# Patient Record
Sex: MALE | Race: WHITE | NOT HISPANIC OR LATINO | Employment: FULL TIME | URBAN - METROPOLITAN AREA
[De-identification: names, ages, dates, MRNs, and addresses within clinical notes are randomized per-mention and may not be internally consistent; named-entity substitution may affect disease eponyms.]

---

## 2023-01-28 PROBLEM — I10 HTN (HYPERTENSION): Status: ACTIVE | Noted: 2023-01-28

## 2023-01-28 PROBLEM — E66.9 OBESITY (BMI 30.0-34.9): Status: ACTIVE | Noted: 2023-01-28

## 2023-01-28 PROBLEM — E78.2 HYPERLIPIDEMIA, MIXED: Status: ACTIVE | Noted: 2023-01-28

## 2023-01-28 PROBLEM — M25.569 KNEE PAIN: Status: ACTIVE | Noted: 2023-01-28

## 2023-01-28 PROBLEM — E66.811 OBESITY (BMI 30.0-34.9): Status: ACTIVE | Noted: 2023-01-28

## 2023-01-28 PROBLEM — Z01.818 PRE-OP EXAM: Status: ACTIVE | Noted: 2023-01-28

## 2023-01-28 PROBLEM — M15.9 OSTEOARTHRITIS OF MULTIPLE JOINTS: Status: ACTIVE | Noted: 2023-01-28

## 2023-01-28 PROBLEM — M86.9: Status: ACTIVE | Noted: 2023-01-28

## 2023-01-28 PROBLEM — M79.673 HEEL PAIN: Status: ACTIVE | Noted: 2023-01-28

## 2023-01-28 RX ORDER — IBUPROFEN 800 MG/1
800 TABLET ORAL 2 TIMES DAILY PRN
COMMUNITY
Start: 2022-05-05 | End: 2023-03-13 | Stop reason: SDUPTHER

## 2023-01-28 RX ORDER — MULTIVITAMIN/IRON/FOLIC ACID 18MG-0.4MG
TABLET ORAL
COMMUNITY
Start: 2022-01-24

## 2023-01-28 RX ORDER — AMLODIPINE BESYLATE 5 MG/1
1 TABLET ORAL DAILY
COMMUNITY
Start: 2022-05-05 | End: 2023-03-13 | Stop reason: SDUPTHER

## 2023-01-28 RX ORDER — FENOFIBRATE 145 MG/1
1 TABLET, FILM COATED ORAL DAILY
COMMUNITY
Start: 2021-04-12 | End: 2023-05-22 | Stop reason: SDUPTHER

## 2023-01-28 RX ORDER — CHOLECALCIFEROL (VITAMIN D3) 50 MCG
1 TABLET ORAL DAILY
COMMUNITY
Start: 2022-01-24

## 2023-01-28 RX ORDER — TADALAFIL 5 MG/1
5 TABLET ORAL DAILY
COMMUNITY
End: 2023-03-24

## 2023-01-28 RX ORDER — ACETAMINOPHEN 500 MG
TABLET ORAL
COMMUNITY
Start: 2022-01-24

## 2023-03-13 ENCOUNTER — OFFICE VISIT (OUTPATIENT)
Dept: PRIMARY CARE | Facility: CLINIC | Age: 63
End: 2023-03-13
Payer: COMMERCIAL

## 2023-03-13 VITALS
DIASTOLIC BLOOD PRESSURE: 90 MMHG | BODY MASS INDEX: 30.34 KG/M2 | HEIGHT: 72 IN | WEIGHT: 224 LBS | SYSTOLIC BLOOD PRESSURE: 154 MMHG

## 2023-03-13 DIAGNOSIS — Z12.5 SCREENING FOR PROSTATE CANCER: ICD-10-CM

## 2023-03-13 DIAGNOSIS — I10 PRIMARY HYPERTENSION: Primary | ICD-10-CM

## 2023-03-13 DIAGNOSIS — R29.818 SUSPECTED SLEEP APNEA: ICD-10-CM

## 2023-03-13 DIAGNOSIS — M19.011 LOCALIZED OSTEOARTHRITIS OF BOTH SHOULDER REGIONS: ICD-10-CM

## 2023-03-13 DIAGNOSIS — E66.9 OBESITY (BMI 30.0-34.9): ICD-10-CM

## 2023-03-13 DIAGNOSIS — Z00.00 HEALTHCARE MAINTENANCE: ICD-10-CM

## 2023-03-13 DIAGNOSIS — E78.2 HYPERLIPIDEMIA, MIXED: ICD-10-CM

## 2023-03-13 DIAGNOSIS — M19.012 LOCALIZED OSTEOARTHRITIS OF BOTH SHOULDER REGIONS: ICD-10-CM

## 2023-03-13 DIAGNOSIS — Z00.00 HEALTH CARE MAINTENANCE: ICD-10-CM

## 2023-03-13 PROCEDURE — 3077F SYST BP >= 140 MM HG: CPT | Performed by: STUDENT IN AN ORGANIZED HEALTH CARE EDUCATION/TRAINING PROGRAM

## 2023-03-13 PROCEDURE — 3080F DIAST BP >= 90 MM HG: CPT | Performed by: STUDENT IN AN ORGANIZED HEALTH CARE EDUCATION/TRAINING PROGRAM

## 2023-03-13 PROCEDURE — 1036F TOBACCO NON-USER: CPT | Performed by: STUDENT IN AN ORGANIZED HEALTH CARE EDUCATION/TRAINING PROGRAM

## 2023-03-13 PROCEDURE — 99396 PREV VISIT EST AGE 40-64: CPT | Performed by: STUDENT IN AN ORGANIZED HEALTH CARE EDUCATION/TRAINING PROGRAM

## 2023-03-13 RX ORDER — IBUPROFEN 800 MG/1
800 TABLET ORAL 2 TIMES DAILY PRN
Qty: 180 TABLET | Refills: 0 | Status: SHIPPED | OUTPATIENT
Start: 2023-03-13 | End: 2023-06-12

## 2023-03-13 RX ORDER — AMLODIPINE BESYLATE 5 MG/1
5 TABLET ORAL DAILY
Qty: 90 TABLET | Refills: 1 | Status: SHIPPED | OUTPATIENT
Start: 2023-03-13 | End: 2023-09-05

## 2023-03-13 RX ORDER — CEPHALEXIN 500 MG/1
CAPSULE ORAL
COMMUNITY
Start: 2023-02-28

## 2023-03-13 NOTE — PROGRESS NOTES
"Subjective   Patient ID: Mann Mcclain is a 62 y.o. male who presents for Follow-up (Questions about \"screenings\").    HPI   Routine fu. Physical.     He had left TSA performed 12/2022. Surgery went well, recovery is also going relatively well, though he notes some numbness in first 3 fingers in right hand, plans to discuss this with surgeon this week.    BP at home has been 110s-130 systolic.    His brother was recently diagnosed with bladder cancer, patient wondering about screening.    He also says that he snores at night, but is not sure if he would like to be tested for ARY.    Review of Systems  12-point ROS reviewed and was negative unless otherwise noted in HPI.    Objective   /90   Ht 1.829 m (6')   Wt 102 kg (224 lb)   BMI 30.38 kg/m²     Physical Exam  GEN: conversant, NAD  HEENT: PERRL, EOMI, wearing a mask  NECK: supple, no carotid bruits appreciated b/l  CV: S1, S2, RRR  PULM: CTAB  ABD: soft, NT, ND  NEURO: no new gross focal deficits  EXT: no sig LE edema  PSYCH: appropriate affect    Assessment/Plan     #HTN  -continue amlodipine, stable on home readings     #HLD/Obesity  - Cont. fenofibrate  - Encouraged continued efforts at weight loss  - check CACS    #Snoring/suspected ARY  -referral to sleep medicine     #HM  - Colonoscopy (2022, next due in 2027 given +FH)  - Vaccines: received COVID-19 vaccines, TDaP (2017), Shingrix, declines flu shot     RTC in 6 months      "

## 2023-03-23 DIAGNOSIS — N52.9 ERECTILE DYSFUNCTION, UNSPECIFIED ERECTILE DYSFUNCTION TYPE: Primary | ICD-10-CM

## 2023-03-24 RX ORDER — TADALAFIL 5 MG/1
TABLET ORAL
Qty: 30 TABLET | Refills: 3 | Status: SHIPPED | OUTPATIENT
Start: 2023-03-24

## 2023-05-22 DIAGNOSIS — E78.2 HYPERLIPIDEMIA, MIXED: Primary | ICD-10-CM

## 2023-05-23 RX ORDER — FENOFIBRATE 145 MG/1
145 TABLET, FILM COATED ORAL DAILY
Qty: 90 TABLET | Refills: 0 | Status: SHIPPED | OUTPATIENT
Start: 2023-05-23 | End: 2023-10-19

## 2023-06-12 DIAGNOSIS — M19.012 LOCALIZED OSTEOARTHRITIS OF BOTH SHOULDER REGIONS: ICD-10-CM

## 2023-06-12 DIAGNOSIS — M19.011 LOCALIZED OSTEOARTHRITIS OF BOTH SHOULDER REGIONS: ICD-10-CM

## 2023-06-12 RX ORDER — IBUPROFEN 800 MG/1
TABLET ORAL
Qty: 180 TABLET | Refills: 0 | Status: SHIPPED | OUTPATIENT
Start: 2023-06-12 | End: 2023-09-11

## 2023-09-04 DIAGNOSIS — I10 PRIMARY HYPERTENSION: ICD-10-CM

## 2023-09-05 RX ORDER — AMLODIPINE BESYLATE 5 MG/1
5 TABLET ORAL DAILY
Qty: 90 TABLET | Refills: 0 | Status: SHIPPED | OUTPATIENT
Start: 2023-09-05 | End: 2023-12-22 | Stop reason: SDUPTHER

## 2023-09-11 DIAGNOSIS — M19.011 LOCALIZED OSTEOARTHRITIS OF BOTH SHOULDER REGIONS: ICD-10-CM

## 2023-09-11 DIAGNOSIS — M19.012 LOCALIZED OSTEOARTHRITIS OF BOTH SHOULDER REGIONS: ICD-10-CM

## 2023-09-11 RX ORDER — IBUPROFEN 800 MG/1
TABLET ORAL
Qty: 180 TABLET | Refills: 0 | Status: SHIPPED | OUTPATIENT
Start: 2023-09-11 | End: 2023-12-13

## 2023-09-13 ENCOUNTER — APPOINTMENT (OUTPATIENT)
Dept: PRIMARY CARE | Facility: CLINIC | Age: 63
End: 2023-09-13
Payer: COMMERCIAL

## 2023-10-12 DIAGNOSIS — E78.2 HYPERLIPIDEMIA, MIXED: ICD-10-CM

## 2023-10-19 RX ORDER — FENOFIBRATE 145 MG/1
145 TABLET, FILM COATED ORAL DAILY
Qty: 30 TABLET | Refills: 0 | Status: SHIPPED | OUTPATIENT
Start: 2023-10-19 | End: 2023-12-22 | Stop reason: SDUPTHER

## 2023-12-04 DIAGNOSIS — I10 PRIMARY HYPERTENSION: ICD-10-CM

## 2023-12-05 RX ORDER — AMLODIPINE BESYLATE 5 MG/1
5 TABLET ORAL DAILY
Qty: 90 TABLET | Refills: 0 | OUTPATIENT
Start: 2023-12-05

## 2023-12-11 DIAGNOSIS — M19.012 LOCALIZED OSTEOARTHRITIS OF BOTH SHOULDER REGIONS: ICD-10-CM

## 2023-12-11 DIAGNOSIS — M19.011 LOCALIZED OSTEOARTHRITIS OF BOTH SHOULDER REGIONS: ICD-10-CM

## 2023-12-13 RX ORDER — IBUPROFEN 800 MG/1
TABLET ORAL
Qty: 60 TABLET | Refills: 0 | Status: SHIPPED | OUTPATIENT
Start: 2023-12-13

## 2023-12-22 ENCOUNTER — OFFICE VISIT (OUTPATIENT)
Dept: PRIMARY CARE | Facility: CLINIC | Age: 63
End: 2023-12-22
Payer: COMMERCIAL

## 2023-12-22 VITALS
WEIGHT: 229 LBS | HEIGHT: 72 IN | OXYGEN SATURATION: 95 % | SYSTOLIC BLOOD PRESSURE: 177 MMHG | BODY MASS INDEX: 31.02 KG/M2 | DIASTOLIC BLOOD PRESSURE: 103 MMHG | HEART RATE: 87 BPM

## 2023-12-22 DIAGNOSIS — I10 PRIMARY HYPERTENSION: ICD-10-CM

## 2023-12-22 DIAGNOSIS — F41.9 ANXIETY: Primary | ICD-10-CM

## 2023-12-22 DIAGNOSIS — E78.2 HYPERLIPIDEMIA, MIXED: ICD-10-CM

## 2023-12-22 PROCEDURE — 99214 OFFICE O/P EST MOD 30 MIN: CPT | Performed by: FAMILY MEDICINE

## 2023-12-22 PROCEDURE — 3080F DIAST BP >= 90 MM HG: CPT | Performed by: FAMILY MEDICINE

## 2023-12-22 PROCEDURE — 3077F SYST BP >= 140 MM HG: CPT | Performed by: FAMILY MEDICINE

## 2023-12-22 PROCEDURE — 1036F TOBACCO NON-USER: CPT | Performed by: FAMILY MEDICINE

## 2023-12-22 RX ORDER — BUSPIRONE HYDROCHLORIDE 5 MG/1
5 TABLET ORAL 2 TIMES DAILY
Qty: 60 TABLET | Refills: 1 | Status: SHIPPED | OUTPATIENT
Start: 2023-12-22 | End: 2024-12-21

## 2023-12-22 RX ORDER — FENOFIBRATE 145 MG/1
145 TABLET, FILM COATED ORAL DAILY
Qty: 90 TABLET | Refills: 0 | Status: SHIPPED | OUTPATIENT
Start: 2023-12-22 | End: 2024-04-05

## 2023-12-22 RX ORDER — AMLODIPINE BESYLATE 5 MG/1
5 TABLET ORAL DAILY
Qty: 90 TABLET | Refills: 1 | Status: SHIPPED | OUTPATIENT
Start: 2023-12-22

## 2023-12-22 ASSESSMENT — ENCOUNTER SYMPTOMS
CONSTITUTIONAL NEGATIVE: 1
HEMATOLOGIC/LYMPHATIC NEGATIVE: 1
RESPIRATORY NEGATIVE: 1
GASTROINTESTINAL NEGATIVE: 1
ARTHRALGIAS: 1
CARDIOVASCULAR NEGATIVE: 1

## 2023-12-22 NOTE — PROGRESS NOTES
Subjective   Patient ID: Mann Mcclain is a 63 y.o. male who presents for Annual Exam.  HPI  Knee replacement   Review of Systems   Constitutional: Negative.    HENT: Negative.     Respiratory: Negative.     Cardiovascular: Negative.    Gastrointestinal: Negative.    Genitourinary: Negative.    Musculoskeletal:  Positive for arthralgias and gait problem.   Hematological: Negative.        Objective   Physical Exam  General no acute process no icterus well-hydrated alert active oriented    HEENT normocephalic no palpable tenderness eyes pupils equal reactive light and accommodation extraocular muscles intact no icterus and/or erythema ears benign external auditory canal no gross deformities nose no discharge drainage erythema bleeding throat no erythema.    Heart regular rate and rhythm without S3-S4 or murmur    Lungs clear to auscultation x2 no rales or rhonchi    Abdomen soft nontender nondistended no palpable masses no organomegaly splenomegaly.    Integument no rash no lumps bumps or concerning lesions.    Neurologic no tics tremors or seizures no decreased range of motion or ataxia.    Musculoskeletal good range of motion no gross abnormalities noted  Assessment/Plan   Problem List Items Addressed This Visit             ICD-10-CM    Hyperlipidemia, mixed E78.2    Relevant Medications    fenofibrate (Tricor) 145 mg tablet    HTN (hypertension) I10    Relevant Medications    amLODIPine (Norvasc) 5 mg tablet            Luis Enrique Nix DO 12/22/23 8:36 AM

## 2024-01-11 ENCOUNTER — HOSPITAL ENCOUNTER (OUTPATIENT)
Dept: VASCULAR MEDICINE | Facility: HOSPITAL | Age: 64
Discharge: HOME | End: 2024-01-11
Payer: COMMERCIAL

## 2024-01-11 DIAGNOSIS — M79.662 PAIN IN LEFT LOWER LEG: ICD-10-CM

## 2024-01-11 DIAGNOSIS — M79.605 PAIN IN LEFT LEG: ICD-10-CM

## 2024-01-11 PROCEDURE — 93971 EXTREMITY STUDY: CPT | Performed by: INTERNAL MEDICINE

## 2024-01-11 PROCEDURE — 93971 EXTREMITY STUDY: CPT

## 2024-06-28 ENCOUNTER — APPOINTMENT (OUTPATIENT)
Dept: PRIMARY CARE | Facility: CLINIC | Age: 64
End: 2024-06-28
Payer: COMMERCIAL

## 2024-06-28 VITALS
SYSTOLIC BLOOD PRESSURE: 148 MMHG | WEIGHT: 219 LBS | HEIGHT: 72 IN | BODY MASS INDEX: 29.66 KG/M2 | OXYGEN SATURATION: 95 % | DIASTOLIC BLOOD PRESSURE: 84 MMHG | HEART RATE: 90 BPM | TEMPERATURE: 98 F

## 2024-06-28 DIAGNOSIS — F41.9 ANXIETY: ICD-10-CM

## 2024-06-28 DIAGNOSIS — E78.2 HYPERLIPIDEMIA, MIXED: ICD-10-CM

## 2024-06-28 DIAGNOSIS — I10 PRIMARY HYPERTENSION: ICD-10-CM

## 2024-06-28 DIAGNOSIS — M19.011 LOCALIZED OSTEOARTHRITIS OF BOTH SHOULDER REGIONS: ICD-10-CM

## 2024-06-28 DIAGNOSIS — E78.2 HYPERLIPIDEMIA, MIXED: Primary | ICD-10-CM

## 2024-06-28 DIAGNOSIS — R05.3 CHRONIC COUGH: ICD-10-CM

## 2024-06-28 DIAGNOSIS — M19.012 LOCALIZED OSTEOARTHRITIS OF BOTH SHOULDER REGIONS: ICD-10-CM

## 2024-06-28 DIAGNOSIS — N52.9 ERECTILE DYSFUNCTION, UNSPECIFIED ERECTILE DYSFUNCTION TYPE: ICD-10-CM

## 2024-06-28 RX ORDER — FENOFIBRATE 145 MG/1
145 TABLET, FILM COATED ORAL DAILY
Qty: 90 TABLET | Refills: 0 | Status: SHIPPED | OUTPATIENT
Start: 2024-06-28 | End: 2024-06-28 | Stop reason: SDUPTHER

## 2024-06-28 RX ORDER — IBUPROFEN 800 MG/1
TABLET ORAL
Qty: 60 TABLET | Refills: 1 | Status: SHIPPED | OUTPATIENT
Start: 2024-06-28

## 2024-06-28 RX ORDER — BUSPIRONE HYDROCHLORIDE 5 MG/1
5 TABLET ORAL 2 TIMES DAILY
Qty: 60 TABLET | Refills: 1 | Status: SHIPPED | OUTPATIENT
Start: 2024-06-28 | End: 2025-06-28

## 2024-06-28 RX ORDER — TADALAFIL 5 MG/1
TABLET ORAL
Qty: 30 TABLET | Refills: 3 | Status: SHIPPED | OUTPATIENT
Start: 2024-06-28

## 2024-06-28 RX ORDER — FENOFIBRATE 145 MG/1
145 TABLET, FILM COATED ORAL DAILY
Qty: 90 TABLET | Refills: 0 | Status: SHIPPED | OUTPATIENT
Start: 2024-06-28

## 2024-06-28 ASSESSMENT — ENCOUNTER SYMPTOMS
MUSCULOSKELETAL NEGATIVE: 1
GASTROINTESTINAL NEGATIVE: 1
NEUROLOGICAL NEGATIVE: 1
CONSTITUTIONAL NEGATIVE: 1
RESPIRATORY NEGATIVE: 1
CARDIOVASCULAR NEGATIVE: 1

## 2024-06-28 ASSESSMENT — PATIENT HEALTH QUESTIONNAIRE - PHQ9
2. FEELING DOWN, DEPRESSED OR HOPELESS: NOT AT ALL
SUM OF ALL RESPONSES TO PHQ9 QUESTIONS 1 & 2: 0
1. LITTLE INTEREST OR PLEASURE IN DOING THINGS: NOT AT ALL

## 2024-06-28 NOTE — PROGRESS NOTES
Subjective   Patient ID: Mann Mcclain is a 64 y.o. male who presents for Annual Exam.  HPI    Review of Systems   Constitutional: Negative.    HENT: Negative.     Respiratory: Negative.     Cardiovascular: Negative.    Gastrointestinal: Negative.    Genitourinary: Negative.    Musculoskeletal: Negative.    Neurological: Negative.        Objective   Physical Exam  Constitutional:       Appearance: Normal appearance.   HENT:      Head: Normocephalic and atraumatic.      Nose: Nose normal.      Mouth/Throat:      Mouth: Mucous membranes are moist.   Eyes:      Extraocular Movements: Extraocular movements intact.      Pupils: Pupils are equal, round, and reactive to light.   Cardiovascular:      Rate and Rhythm: Normal rate and regular rhythm.   Abdominal:      General: Abdomen is flat.      Palpations: Abdomen is soft.   Musculoskeletal:         General: Normal range of motion.   Skin:     General: Skin is warm and dry.   Neurological:      Mental Status: He is alert.         Assessment/Plan   Problem List Items Addressed This Visit             ICD-10-CM    Hyperlipidemia, mixed - Primary E78.2    Relevant Medications    fenofibrate (Tricor) 145 mg tablet    Other Relevant Orders    Comprehensive Metabolic Panel    Lipid Panel    Prostate Specific Antigen    CT cardiac scoring wo IV contrast    XR chest 2 views    CBC    Microscopic Only, Urine    HTN (hypertension) I10    Relevant Orders    Comprehensive Metabolic Panel    Lipid Panel    Prostate Specific Antigen    CT cardiac scoring wo IV contrast    XR chest 2 views    CBC    Microscopic Only, Urine     Other Visit Diagnoses         Codes    Chronic cough     R05.3    Relevant Orders    Comprehensive Metabolic Panel    Lipid Panel    Prostate Specific Antigen    CT cardiac scoring wo IV contrast    XR chest 2 views    CBC    Microscopic Only, Urine    Localized osteoarthritis of both shoulder regions     M19.011, M19.012    Relevant Medications    ibuprofen 800  mg tablet    Anxiety     F41.9    Relevant Medications    busPIRone (Buspar) 5 mg tablet    Erectile dysfunction, unspecified erectile dysfunction type     N52.9    Relevant Medications    tadalafil (Cialis) 5 mg tablet                 Luis Enrique Nix DO 06/28/24 1:25 PM

## 2024-08-12 ENCOUNTER — APPOINTMENT (OUTPATIENT)
Dept: PRIMARY CARE | Facility: CLINIC | Age: 64
End: 2024-08-12
Payer: COMMERCIAL

## 2024-09-23 DIAGNOSIS — F41.9 ANXIETY: ICD-10-CM

## 2024-09-23 RX ORDER — BUSPIRONE HYDROCHLORIDE 5 MG/1
5 TABLET ORAL 2 TIMES DAILY
Qty: 60 TABLET | Refills: 1 | Status: SHIPPED | OUTPATIENT
Start: 2024-09-23 | End: 2025-09-23

## 2024-09-23 NOTE — TELEPHONE ENCOUNTER
Rx Refill Request Telephone Encounter    Name:  Mann Mcclain  :  [id number]  Medication Name:  BUSPIRONE   5 MG          Specific Pharmacy location:  [facility]  Date of last appointment:  2024  Date of next appointment:  10/18/2024  Best number to reach patient:

## 2024-10-03 ENCOUNTER — HOSPITAL ENCOUNTER (OUTPATIENT)
Dept: RADIOLOGY | Facility: HOSPITAL | Age: 64
Discharge: HOME | End: 2024-10-03
Payer: COMMERCIAL

## 2024-10-03 DIAGNOSIS — E78.2 HYPERLIPIDEMIA, MIXED: ICD-10-CM

## 2024-10-03 DIAGNOSIS — R05.3 CHRONIC COUGH: ICD-10-CM

## 2024-10-03 DIAGNOSIS — I10 PRIMARY HYPERTENSION: ICD-10-CM

## 2024-10-03 PROCEDURE — 71046 X-RAY EXAM CHEST 2 VIEWS: CPT

## 2024-10-03 PROCEDURE — 75571 CT HRT W/O DYE W/CA TEST: CPT

## 2024-10-10 ENCOUNTER — APPOINTMENT (OUTPATIENT)
Dept: PRIMARY CARE | Facility: CLINIC | Age: 64
End: 2024-10-10
Payer: COMMERCIAL

## 2024-10-10 ENCOUNTER — LAB (OUTPATIENT)
Dept: LAB | Facility: LAB | Age: 64
End: 2024-10-10
Payer: COMMERCIAL

## 2024-10-10 DIAGNOSIS — I10 PRIMARY HYPERTENSION: ICD-10-CM

## 2024-10-10 DIAGNOSIS — R05.3 CHRONIC COUGH: ICD-10-CM

## 2024-10-10 DIAGNOSIS — E78.2 HYPERLIPIDEMIA, MIXED: ICD-10-CM

## 2024-10-10 LAB
ALBUMIN SERPL BCP-MCNC: 4.5 G/DL (ref 3.4–5)
ALP SERPL-CCNC: 28 U/L (ref 33–136)
ALT SERPL W P-5'-P-CCNC: 33 U/L (ref 10–52)
ANION GAP SERPL CALC-SCNC: 11 MMOL/L (ref 10–20)
AST SERPL W P-5'-P-CCNC: 27 U/L (ref 9–39)
BILIRUB SERPL-MCNC: 0.7 MG/DL (ref 0–1.2)
BUN SERPL-MCNC: 14 MG/DL (ref 6–23)
CALCIUM SERPL-MCNC: 9.2 MG/DL (ref 8.6–10.6)
CHLORIDE SERPL-SCNC: 103 MMOL/L (ref 98–107)
CHOLEST SERPL-MCNC: 184 MG/DL (ref 0–199)
CHOLESTEROL/HDL RATIO: 3.3
CO2 SERPL-SCNC: 29 MMOL/L (ref 21–32)
CREAT SERPL-MCNC: 0.86 MG/DL (ref 0.5–1.3)
EGFRCR SERPLBLD CKD-EPI 2021: >90 ML/MIN/1.73M*2
ERYTHROCYTE [DISTWIDTH] IN BLOOD BY AUTOMATED COUNT: 12.2 % (ref 11.5–14.5)
GLUCOSE SERPL-MCNC: 90 MG/DL (ref 74–99)
HCT VFR BLD AUTO: 46.9 % (ref 41–52)
HDLC SERPL-MCNC: 55.5 MG/DL
HGB BLD-MCNC: 15.4 G/DL (ref 13.5–17.5)
LDLC SERPL CALC-MCNC: 108 MG/DL
MCH RBC QN AUTO: 29.9 PG (ref 26–34)
MCHC RBC AUTO-ENTMCNC: 32.8 G/DL (ref 32–36)
MCV RBC AUTO: 91 FL (ref 80–100)
NON HDL CHOLESTEROL: 129 MG/DL (ref 0–149)
NRBC BLD-RTO: 0 /100 WBCS (ref 0–0)
PLATELET # BLD AUTO: 195 X10*3/UL (ref 150–450)
POTASSIUM SERPL-SCNC: 4.2 MMOL/L (ref 3.5–5.3)
PROT SERPL-MCNC: 7.2 G/DL (ref 6.4–8.2)
PSA SERPL-MCNC: 1.76 NG/ML
RBC # BLD AUTO: 5.15 X10*6/UL (ref 4.5–5.9)
RBC #/AREA URNS AUTO: NORMAL /HPF
SODIUM SERPL-SCNC: 139 MMOL/L (ref 136–145)
TRIGL SERPL-MCNC: 105 MG/DL (ref 0–149)
VLDL: 21 MG/DL (ref 0–40)
WBC # BLD AUTO: 4.6 X10*3/UL (ref 4.4–11.3)
WBC #/AREA URNS AUTO: NORMAL /HPF

## 2024-10-10 PROCEDURE — 81001 URINALYSIS AUTO W/SCOPE: CPT

## 2024-10-10 PROCEDURE — 84153 ASSAY OF PSA TOTAL: CPT

## 2024-10-10 PROCEDURE — 80053 COMPREHEN METABOLIC PANEL: CPT

## 2024-10-10 PROCEDURE — 80061 LIPID PANEL: CPT

## 2024-10-10 PROCEDURE — 85027 COMPLETE CBC AUTOMATED: CPT

## 2024-10-10 PROCEDURE — 36415 COLL VENOUS BLD VENIPUNCTURE: CPT

## 2024-10-18 ENCOUNTER — APPOINTMENT (OUTPATIENT)
Dept: PRIMARY CARE | Facility: CLINIC | Age: 64
End: 2024-10-18
Payer: COMMERCIAL

## 2024-10-18 VITALS
SYSTOLIC BLOOD PRESSURE: 167 MMHG | HEIGHT: 72 IN | DIASTOLIC BLOOD PRESSURE: 103 MMHG | WEIGHT: 226 LBS | BODY MASS INDEX: 30.61 KG/M2 | HEART RATE: 81 BPM | OXYGEN SATURATION: 95 %

## 2024-10-18 DIAGNOSIS — N52.9 ERECTILE DYSFUNCTION, UNSPECIFIED ERECTILE DYSFUNCTION TYPE: ICD-10-CM

## 2024-10-18 DIAGNOSIS — I10 PRIMARY HYPERTENSION: Primary | ICD-10-CM

## 2024-10-18 DIAGNOSIS — M19.012 LOCALIZED OSTEOARTHRITIS OF BOTH SHOULDER REGIONS: ICD-10-CM

## 2024-10-18 DIAGNOSIS — I25.10 ASHD (ARTERIOSCLEROTIC HEART DISEASE): ICD-10-CM

## 2024-10-18 DIAGNOSIS — M19.011 LOCALIZED OSTEOARTHRITIS OF BOTH SHOULDER REGIONS: ICD-10-CM

## 2024-10-18 DIAGNOSIS — E78.2 HYPERLIPIDEMIA, MIXED: ICD-10-CM

## 2024-10-18 PROCEDURE — 99214 OFFICE O/P EST MOD 30 MIN: CPT | Performed by: FAMILY MEDICINE

## 2024-10-18 PROCEDURE — 3077F SYST BP >= 140 MM HG: CPT | Performed by: FAMILY MEDICINE

## 2024-10-18 PROCEDURE — 3008F BODY MASS INDEX DOCD: CPT | Performed by: FAMILY MEDICINE

## 2024-10-18 PROCEDURE — 3080F DIAST BP >= 90 MM HG: CPT | Performed by: FAMILY MEDICINE

## 2024-10-18 RX ORDER — TADALAFIL 5 MG/1
TABLET ORAL
Qty: 30 TABLET | Refills: 3 | Status: SHIPPED | OUTPATIENT
Start: 2024-10-18

## 2024-10-18 RX ORDER — METOPROLOL SUCCINATE 50 MG/1
50 TABLET, EXTENDED RELEASE ORAL DAILY
Qty: 90 TABLET | Refills: 1 | Status: SHIPPED | OUTPATIENT
Start: 2024-10-18 | End: 2025-04-16

## 2024-10-18 RX ORDER — FENOFIBRATE 145 MG/1
145 TABLET, FILM COATED ORAL DAILY
Qty: 90 TABLET | Refills: 2 | Status: SHIPPED | OUTPATIENT
Start: 2024-10-18

## 2024-10-18 RX ORDER — IBUPROFEN 800 MG/1
TABLET ORAL
Qty: 100 TABLET | Refills: 1 | Status: SHIPPED | OUTPATIENT
Start: 2024-10-18

## 2024-10-18 ASSESSMENT — PATIENT HEALTH QUESTIONNAIRE - PHQ9
1. LITTLE INTEREST OR PLEASURE IN DOING THINGS: NOT AT ALL
2. FEELING DOWN, DEPRESSED OR HOPELESS: NOT AT ALL
SUM OF ALL RESPONSES TO PHQ9 QUESTIONS 1 AND 2: 0

## 2024-10-18 ASSESSMENT — ENCOUNTER SYMPTOMS
HEMATOLOGIC/LYMPHATIC NEGATIVE: 1
CONSTITUTIONAL NEGATIVE: 1
MUSCULOSKELETAL NEGATIVE: 1
CARDIOVASCULAR NEGATIVE: 1
RESPIRATORY NEGATIVE: 1
NEUROLOGICAL NEGATIVE: 1

## 2024-10-18 NOTE — PROGRESS NOTES
Subjective   Patient ID: Mann Mcclain is a 64 y.o. male who presents for Follow-up (Test results ).  HPI  Doing well no acute process  Review of Systems   Constitutional: Negative.    HENT: Negative.     Respiratory: Negative.     Cardiovascular: Negative.    Genitourinary: Negative.    Musculoskeletal: Negative.    Neurological: Negative.    Hematological: Negative.    Physical Exam  Constitutional:       Appearance: Normal appearance.   HENT:      Head: Normocephalic and atraumatic.      Nose: Nose normal.      Mouth/Throat:      Mouth: Mucous membranes are moist.   Eyes:      Extraocular Movements: Extraocular movements intact.      Pupils: Pupils are equal, round, and reactive to light.   Cardiovascular:      Rate and Rhythm: Normal rate and regular rhythm.   Abdominal:      General: Abdomen is flat.      Palpations: Abdomen is soft.   Musculoskeletal:         General: Normal range of motion.   Skin:     General: Skin is warm and dry.   Neurological:      Mental Status: He is alert.           Objective   Physical Exam    Assessment/Plan            Luis Enrique Nix DO 10/18/24 8:33 AM

## 2024-10-21 ENCOUNTER — TELEPHONE (OUTPATIENT)
Dept: PRIMARY CARE | Facility: CLINIC | Age: 64
End: 2024-10-21
Payer: COMMERCIAL

## 2024-10-21 NOTE — TELEPHONE ENCOUNTER
Spoke to patient regarding lab results, he does have questions that he would like to talk to you about regarding the PSA and also his Chest xray results.

## 2024-11-14 ENCOUNTER — APPOINTMENT (OUTPATIENT)
Dept: PRIMARY CARE | Facility: CLINIC | Age: 64
End: 2024-11-14
Payer: COMMERCIAL

## 2024-11-14 DIAGNOSIS — R05.3 CHRONIC COUGH: Primary | ICD-10-CM

## 2024-11-14 PROCEDURE — 99442 PR PHYS/QHP TELEPHONE EVALUATION 11-20 MIN: CPT | Performed by: FAMILY MEDICINE

## 2024-11-15 NOTE — PROGRESS NOTES
Subjective   Patient ID: Mann Mcclain is a 64 y.o. male who presents for Follow-up (X-ray results).  HPI  Patient could not uses video we did a telephone visit instead.  Spent 10 to 15 minutes talk to him on the phone patient needed to review x-rays as well as medications.  Patient had an abnormal chest x-ray need follow-up on his chest x-ray I reviewed it review the images myself talked it over with the patient told me he was having some blunting of the CV angle on the right and had some what looks like atelectasis I talked him about causes of atelectasis and told him definitely needed to follow-up to make sure that there was some clearing or make sure that there was no specific problems behind her within the atelectatic area.    Patient is going to get a another chest x-ray we will follow-up with us in the office.I performed this visit using real-time telehealth tools including an audio/video or telephone connection between this patient at their home and myself at [facility] office   Review of Systems    Objective   Physical Exam    Assessment/Plan            Luis Enrique Nix DO 11/15/24 8:01 AM

## 2024-12-12 ENCOUNTER — HOSPITAL ENCOUNTER (OUTPATIENT)
Dept: RADIOLOGY | Facility: HOSPITAL | Age: 64
Discharge: HOME | End: 2024-12-12
Payer: COMMERCIAL

## 2024-12-12 DIAGNOSIS — R05.3 CHRONIC COUGH: ICD-10-CM

## 2024-12-12 PROCEDURE — 71046 X-RAY EXAM CHEST 2 VIEWS: CPT

## 2024-12-20 ENCOUNTER — TELEPHONE (OUTPATIENT)
Dept: PRIMARY CARE | Facility: CLINIC | Age: 64
End: 2024-12-20
Payer: COMMERCIAL

## 2024-12-20 NOTE — TELEPHONE ENCOUNTER
----- Message from Luis Enrique Nix sent at 12/20/2024  8:13 AM EST -----  Chest x-ray shows no significant change

## 2025-01-09 ENCOUNTER — APPOINTMENT (OUTPATIENT)
Dept: PRIMARY CARE | Facility: CLINIC | Age: 65
End: 2025-01-09
Payer: COMMERCIAL

## 2025-01-28 ENCOUNTER — TELEPHONE (OUTPATIENT)
Dept: PRIMARY CARE | Facility: CLINIC | Age: 65
End: 2025-01-28
Payer: COMMERCIAL

## 2025-01-28 PROBLEM — I80.9 SUPERFICIAL THROMBOPHLEBITIS: Status: ACTIVE | Noted: 2025-01-28

## 2025-01-28 PROBLEM — M21.162 ACQUIRED VARUS DEFORMITY OF KNEE, LEFT: Status: ACTIVE | Noted: 2023-08-17

## 2025-01-28 PROBLEM — Z01.818 PRE-OP EXAM: Status: RESOLVED | Noted: 2023-01-28 | Resolved: 2025-01-28

## 2025-01-28 PROBLEM — M21.161 ACQUIRED VARUS DEFORMITY OF KNEE, RIGHT: Status: ACTIVE | Noted: 2023-08-17

## 2025-01-28 PROBLEM — M19.012 OSTEOARTHRITIS OF LEFT SHOULDER: Status: ACTIVE | Noted: 2025-01-28

## 2025-01-28 PROBLEM — M17.0 PRIMARY OSTEOARTHRITIS OF BOTH KNEES: Status: ACTIVE | Noted: 2023-08-17

## 2025-01-28 PROBLEM — Z96.652 STATUS POST TOTAL LEFT KNEE REPLACEMENT: Status: ACTIVE | Noted: 2024-05-06

## 2025-01-28 PROBLEM — M23.8X2 CREPITUS OF BOTH KNEE JOINTS: Status: ACTIVE | Noted: 2023-08-17

## 2025-01-28 PROBLEM — M23.8X1 CREPITUS OF BOTH KNEE JOINTS: Status: ACTIVE | Noted: 2023-08-17

## 2025-01-28 NOTE — TELEPHONE ENCOUNTER
Patient wants to know what the next steps are for his chest X-ray and his symptoms. Please advise.

## 2025-02-15 DIAGNOSIS — F41.9 ANXIETY: ICD-10-CM

## 2025-02-17 DIAGNOSIS — R93.89 ABNORMAL CXR: Primary | ICD-10-CM

## 2025-02-17 PROBLEM — I25.10 CORONARY ARTERY DISEASE INVOLVING NATIVE CORONARY ARTERY OF NATIVE HEART WITHOUT ANGINA PECTORIS: Chronic | Status: ACTIVE | Noted: 2025-02-17

## 2025-02-17 PROBLEM — I10 HTN (HYPERTENSION): Chronic | Status: ACTIVE | Noted: 2023-01-28

## 2025-02-17 PROBLEM — M23.8X1 CREPITUS OF BOTH KNEE JOINTS: Status: RESOLVED | Noted: 2023-08-17 | Resolved: 2025-02-17

## 2025-02-17 PROBLEM — M15.9 OSTEOARTHRITIS OF MULTIPLE JOINTS: Status: RESOLVED | Noted: 2023-01-28 | Resolved: 2025-02-17

## 2025-02-17 PROBLEM — M17.0 PRIMARY OSTEOARTHRITIS OF BOTH KNEES: Status: RESOLVED | Noted: 2023-08-17 | Resolved: 2025-02-17

## 2025-02-17 PROBLEM — M21.162 ACQUIRED VARUS DEFORMITY OF KNEE, LEFT: Status: RESOLVED | Noted: 2023-08-17 | Resolved: 2025-02-17

## 2025-02-17 PROBLEM — M21.161 ACQUIRED VARUS DEFORMITY OF KNEE, RIGHT: Status: RESOLVED | Noted: 2023-08-17 | Resolved: 2025-02-17

## 2025-02-17 PROBLEM — Z96.652 STATUS POST TOTAL LEFT KNEE REPLACEMENT: Status: RESOLVED | Noted: 2024-05-06 | Resolved: 2025-02-17

## 2025-02-17 PROBLEM — M25.569 KNEE PAIN: Status: RESOLVED | Noted: 2023-01-28 | Resolved: 2025-02-17

## 2025-02-17 PROBLEM — M79.673 HEEL PAIN: Status: RESOLVED | Noted: 2023-01-28 | Resolved: 2025-02-17

## 2025-02-17 PROBLEM — E78.2 HYPERLIPIDEMIA, MIXED: Chronic | Status: ACTIVE | Noted: 2023-01-28

## 2025-02-17 PROBLEM — M19.012 OSTEOARTHRITIS OF LEFT SHOULDER: Status: RESOLVED | Noted: 2025-01-28 | Resolved: 2025-02-17

## 2025-02-17 PROBLEM — M23.8X2 CREPITUS OF BOTH KNEE JOINTS: Status: RESOLVED | Noted: 2023-08-17 | Resolved: 2025-02-17

## 2025-02-17 RX ORDER — BUSPIRONE HYDROCHLORIDE 5 MG/1
5 TABLET ORAL 2 TIMES DAILY
Qty: 60 TABLET | Refills: 0 | Status: SHIPPED | OUTPATIENT
Start: 2025-02-17

## 2025-02-17 NOTE — PROGRESS NOTES
Referred by Boyd    HPI obtained.  The request of Dr. Nix for evaluation of shortness of breath and coronary artery disease.  Mann is 64.  He has hypertension and hyperlipidemia.  He is active but somewhat limited because of arthritic issues especially in his left knee.  He has no chest pain but over the last 1 to 2 years he has been noticing more dyspnea with exertion.  No orthopnea no PND no significant palpitations.  He has been managed with metoprolol succinate for blood pressure and with fenofibrate for hyperlipidemia.  A calcium score came back elevated in October 2024 measuring 254 units.  He was therefore sent for further evaluation.    Past Medical History:  Problem List Items Addressed This Visit    None     No past medical history on file.     Past Surgical History:  He has a past surgical history that includes Other surgical history (01/24/2022).      Social History:  He reports that he has quit smoking. His smoking use included cigarettes. He has never used smokeless tobacco. He reports current alcohol use of about 4.0 standard drinks of alcohol per week. He reports that he does not use drugs.    Family History:  Family History   Problem Relation Name Age of Onset    Colon cancer Father       Allergies:  Patient has no known allergies.    Outpatient Medications:  Current Outpatient Medications   Medication Instructions    acetaminophen (Tylenol) 500 mg tablet SB Pain Reliever Ex St 500 MG Oral Tablet   Refills: 0        Start : 24-Jan-2022   Active    amLODIPine (NORVASC) 5 mg, oral, Daily    busPIRone (BUSPAR) 5 mg, oral, 2 times daily    cephalexin (Keflex) 500 mg capsule TAKE FOUR CAPSULES BY MOUTH 1 HOUR BEFORE DENTAL APPOINTMENT    cholecalciferol (Vitamin D-3) 50 MCG (2000 UT) tablet 1 tablet, oral, Daily    diclofenac sodium 1 % kit APPLY sparingly to affected area Twice daily    fenofibrate (TRICOR) 145 mg, oral, Daily    glucosamine/chondr soto A sod (glucosamine-chondroitin) 1,500-1,200  mg/30 mL liquid Glucosamine-Chondroitin 7441-2098 MG/30ML Oral Liquid   Refills: 0        Start : 24-Jan-2022   Active  480 ML Bottle    ibuprofen 800 mg tablet TAKE 1 TABLET TWICE A DAY AS NEEDED FOR PAIN (NEED APPOINTMENT, CALL OFFICE TODAY TO SCHEDULE)    metoprolol succinate XL (TOPROL-XL) 50 mg, oral, Daily, Do not crush or chew.    multivit-min/ferrous fumarate (MULTI VITAMIN ORAL) Multi Vitamin Oral Tablet   Refills: 0        Start : 24-Jan-2022   Active    tadalafil (Cialis) 5 mg tablet TAKE 1 TABLET DAILY 1 HOUR BEFORE NEEDED     Last Recorded Vitals:  There were no vitals filed for this visit.    Physical Exam  Patient is alert and oriented x3.  HEENT is unremarkable mucous members are moist  Neck no JVP no bruits upstrokes are full no thyromegaly  Lungs are clear bilaterally.  No wheezing crackles or rales  Heart regular rhythm normal S1-S2 there is no S3 no murmurs are heard.  Abdomen is soft bs are positive nontender nondistended no organomegaly no pulsatile masses  Extremities have no edema.  Distal pulses present palpable.  Neuro is grossly nonfocal  Skin has no rashes     Last Labs:  CBC -  Lab Results   Component Value Date    WBC 4.6 10/10/2024    HGB 15.4 10/10/2024    HCT 46.9 10/10/2024    MCV 91 10/10/2024     10/10/2024     CMP -  Lab Results   Component Value Date    CALCIUM 9.2 10/10/2024    PROT 7.2 10/10/2024    ALBUMIN 4.5 10/10/2024    AST 27 10/10/2024    ALT 33 10/10/2024    ALKPHOS 28 (L) 10/10/2024    BILITOT 0.7 10/10/2024     LIPID PANEL -   Lab Results   Component Value Date    CHOL 184 10/10/2024    HDL 55.5 10/10/2024    CHHDL 3.3 10/10/2024    VLDL 21 10/10/2024    TRIG 105 10/10/2024    NHDL 129 10/10/2024     RENAL FUNCTION PANEL -   Lab Results   Component Value Date    K 4.2 10/10/2024     Lab Results   Component Value Date    HGBA1C 5.2 03/09/2022   Procedures    CAC 10/3/2024 elevated 254 units         Assessment/Plan   1.  CAD.  Elevated calcium score 254 units.   He has shortness of breath.  The shortness of breath may be related to myocardial ischemia but could also be just due to deconditioning.  I have asked for him to try to complete an exercise nuclear stress test.  He is not able to, we will convert this to a regadenoson.  He will hold his metoprolol the morning of the test.  I have asked for him to start 1 baby aspirin daily, and rosuvastatin.    2.  Hyperlipidemia.  10/10/2024  HDL 56 triglycerides 105 LFTs normal.  He is already on fenofibric.  I will add rosuvastatin 20 mg.  Target LDL less than 70 preferably closer to 55.  Fasting blood work 3 months    3.  Hypertension. Well controlled with metoprolol succ 25mg/day    EKG today.  Exercise nuclear stress test holding metoprolol.  Initiate a baby aspirin and rosuvastatin 20 mg daily.  Fasting blood work 3 months.  Return to see me 3-1/2 months.    Faustino Lowe MD     Instructions and follow up

## 2025-02-18 ENCOUNTER — OFFICE VISIT (OUTPATIENT)
Dept: CARDIOLOGY | Facility: CLINIC | Age: 65
End: 2025-02-18
Payer: COMMERCIAL

## 2025-02-18 VITALS
WEIGHT: 229 LBS | BODY MASS INDEX: 31.49 KG/M2 | OXYGEN SATURATION: 96 % | HEART RATE: 76 BPM | SYSTOLIC BLOOD PRESSURE: 124 MMHG | DIASTOLIC BLOOD PRESSURE: 78 MMHG

## 2025-02-18 DIAGNOSIS — R06.02 SHORTNESS OF BREATH: Chronic | ICD-10-CM

## 2025-02-18 DIAGNOSIS — I10 PRIMARY HYPERTENSION: Primary | Chronic | ICD-10-CM

## 2025-02-18 DIAGNOSIS — I25.10 CORONARY ARTERY DISEASE INVOLVING NATIVE CORONARY ARTERY OF NATIVE HEART WITHOUT ANGINA PECTORIS: Chronic | ICD-10-CM

## 2025-02-18 DIAGNOSIS — I25.10 ASHD (ARTERIOSCLEROTIC HEART DISEASE): ICD-10-CM

## 2025-02-18 DIAGNOSIS — E78.2 HYPERLIPIDEMIA, MIXED: Chronic | ICD-10-CM

## 2025-02-18 LAB
ATRIAL RATE: 74 BPM
P AXIS: 70 DEGREES
P OFFSET: 179 MS
P ONSET: 137 MS
PR INTERVAL: 170 MS
Q ONSET: 222 MS
QRS COUNT: 12 BEATS
QRS DURATION: 110 MS
QT INTERVAL: 386 MS
QTC CALCULATION(BAZETT): 428 MS
QTC FREDERICIA: 414 MS
R AXIS: 15 DEGREES
T AXIS: 22 DEGREES
T OFFSET: 415 MS
VENTRICULAR RATE: 74 BPM

## 2025-02-18 PROCEDURE — 99204 OFFICE O/P NEW MOD 45 MIN: CPT | Performed by: INTERNAL MEDICINE

## 2025-02-18 PROCEDURE — 93005 ELECTROCARDIOGRAM TRACING: CPT | Performed by: INTERNAL MEDICINE

## 2025-02-18 PROCEDURE — 3074F SYST BP LT 130 MM HG: CPT | Performed by: INTERNAL MEDICINE

## 2025-02-18 PROCEDURE — 1036F TOBACCO NON-USER: CPT | Performed by: INTERNAL MEDICINE

## 2025-02-18 PROCEDURE — 99214 OFFICE O/P EST MOD 30 MIN: CPT | Performed by: INTERNAL MEDICINE

## 2025-02-18 PROCEDURE — 3078F DIAST BP <80 MM HG: CPT | Performed by: INTERNAL MEDICINE

## 2025-02-18 RX ORDER — NAPROXEN SODIUM 220 MG/1
81 TABLET, FILM COATED ORAL DAILY
Qty: 30 TABLET | Refills: 11 | Status: SHIPPED | OUTPATIENT
Start: 2025-02-18 | End: 2026-02-18

## 2025-02-18 RX ORDER — METOPROLOL SUCCINATE 25 MG/1
25 TABLET, EXTENDED RELEASE ORAL DAILY
Qty: 90 TABLET | Refills: 3 | Status: SHIPPED | OUTPATIENT
Start: 2025-02-18 | End: 2026-02-18

## 2025-02-18 RX ORDER — ROSUVASTATIN CALCIUM 20 MG/1
20 TABLET, COATED ORAL DAILY
Qty: 30 TABLET | Refills: 11 | Status: SHIPPED | OUTPATIENT
Start: 2025-02-18 | End: 2026-02-18

## 2025-02-18 NOTE — TELEPHONE ENCOUNTER
Spoke to pt regarding this.    Dr. Knight at this time does not have concerns of cancer, he is thinking the patients diaphragm may be paralyzed. Last time I spoke to Eugene, he had reached out to another radiologist to get a second opinion on his last x-ray because he felt the report wasn't adequate. I'm not sure if he has heard back on this yet.    Dr. Knight wants the patient to get an FL sniff test, it is an xray that he breathes in for one image and breathes out for another, and this can help determine if his diaphragm is moving properly when breathing.    Pt is going to schedule, I did give him the referral line since I wasn't sure if this test is provided at our location.

## 2025-02-18 NOTE — PATIENT INSTRUCTIONS
1.  CAD.  Elevated calcium score 254 units.  He has shortness of breath.  The shortness of breath may be related to myocardial ischemia but could also be just due to deconditioning.  I have asked for him to try to complete an exercise nuclear stress test.  He is not able to, we will convert this to a regadenoson.  He will hold his metoprolol the morning of the test.  I have asked for him to start 1 baby aspirin daily, and rosuvastatin.    2.  Hyperlipidemia.  10/10/2024  HDL 56 triglycerides 105 LFTs normal.  He is already on fenofibric.  I will add rosuvastatin 20 mg.  Target LDL less than 70 preferably closer to 55.  Fasting blood work 3 months    3.  Hypertension. Well controlled with metoprolol succ 25mg/day    EKG today.  Exercise nuclear stress test holding metoprolol.  Initiate a baby aspirin and rosuvastatin 20 mg daily.  Fasting blood work 3 months.  Return to see me 3-1/2 months.

## 2025-02-27 ENCOUNTER — HOSPITAL ENCOUNTER (OUTPATIENT)
Dept: RADIOLOGY | Facility: HOSPITAL | Age: 65
Discharge: HOME | End: 2025-02-27
Payer: COMMERCIAL

## 2025-02-27 DIAGNOSIS — R93.89 ABNORMAL CXR: ICD-10-CM

## 2025-02-27 PROCEDURE — 76000 FLUOROSCOPY <1 HR PHYS/QHP: CPT

## 2025-03-06 ENCOUNTER — TELEPHONE (OUTPATIENT)
Dept: PRIMARY CARE | Facility: CLINIC | Age: 65
End: 2025-03-06
Payer: COMMERCIAL

## 2025-03-17 ENCOUNTER — APPOINTMENT (OUTPATIENT)
Dept: PRIMARY CARE | Facility: CLINIC | Age: 65
End: 2025-03-17
Payer: COMMERCIAL

## 2025-03-17 DIAGNOSIS — J98.6 PARALYSIS, DIAPHRAGM: Primary | ICD-10-CM

## 2025-03-17 PROCEDURE — 99213 OFFICE O/P EST LOW 20 MIN: CPT | Performed by: FAMILY MEDICINE

## 2025-03-17 NOTE — PROGRESS NOTES
Subjective   Patient ID: Mann Mcclain is a 64 y.o. male who presents for No chief complaint on file..  HPI  Patient has what sounds like a paralyzed diaphragm and chest x-ray which looked abnormal we did a sniff test which also looked abnormal patient has no significant complaints no shortness of breath no cough or congestion patient did have 2 pretty significant infections tooth infections within the past year also had a respiratory infection that could be causative reason for the paralysis we will send him over to pulmonology follow-up with pulmonology for PFTs and formal evaluation and then we will take it from there.    Remainder of the review of systems was benign.  I performed this visit using real-time telehealth tools including an audio/video or telephone connection between this patient at their home and myself at [facility] office     Review of Systems    Objective   Physical Exam    Assessment/Plan   Problem List Items Addressed This Visit    None  Visit Diagnoses         Codes    Paralysis, diaphragm    -  Primary J98.6    Relevant Orders    Referral to Pulmonology                 Luis Enrique Nix DO 03/17/25 9:34 AM

## 2025-03-21 ENCOUNTER — HOSPITAL ENCOUNTER (OUTPATIENT)
Dept: RADIOLOGY | Facility: HOSPITAL | Age: 65
Discharge: HOME | End: 2025-03-21
Payer: COMMERCIAL

## 2025-03-21 ENCOUNTER — APPOINTMENT (OUTPATIENT)
Dept: RADIOLOGY | Facility: CLINIC | Age: 65
End: 2025-03-21
Payer: COMMERCIAL

## 2025-03-21 ENCOUNTER — HOSPITAL ENCOUNTER (OUTPATIENT)
Dept: CARDIOLOGY | Facility: HOSPITAL | Age: 65
Discharge: HOME | End: 2025-03-21
Payer: COMMERCIAL

## 2025-03-21 ENCOUNTER — APPOINTMENT (OUTPATIENT)
Dept: CARDIOLOGY | Facility: CLINIC | Age: 65
End: 2025-03-21
Payer: COMMERCIAL

## 2025-03-21 DIAGNOSIS — I25.10 CORONARY ARTERY DISEASE INVOLVING NATIVE CORONARY ARTERY OF NATIVE HEART WITHOUT ANGINA PECTORIS: Chronic | ICD-10-CM

## 2025-03-21 DIAGNOSIS — I25.10 CORONARY ARTERY DISEASE INVOLVING NATIVE CORONARY ARTERY OF NATIVE HEART WITHOUT ANGINA PECTORIS: Primary | Chronic | ICD-10-CM

## 2025-03-21 DIAGNOSIS — E78.2 HYPERLIPIDEMIA, MIXED: Chronic | ICD-10-CM

## 2025-03-21 PROCEDURE — 78452 HT MUSCLE IMAGE SPECT MULT: CPT

## 2025-03-21 PROCEDURE — A9502 TC99M TETROFOSMIN: HCPCS | Performed by: INTERNAL MEDICINE

## 2025-03-21 PROCEDURE — 93016 CV STRESS TEST SUPVJ ONLY: CPT | Performed by: STUDENT IN AN ORGANIZED HEALTH CARE EDUCATION/TRAINING PROGRAM

## 2025-03-21 PROCEDURE — 93018 CV STRESS TEST I&R ONLY: CPT | Performed by: STUDENT IN AN ORGANIZED HEALTH CARE EDUCATION/TRAINING PROGRAM

## 2025-03-21 PROCEDURE — 93017 CV STRESS TEST TRACING ONLY: CPT

## 2025-03-21 PROCEDURE — 3430000001 HC RX 343 DIAGNOSTIC RADIOPHARMACEUTICALS: Performed by: INTERNAL MEDICINE

## 2025-03-21 PROCEDURE — 2500000004 HC RX 250 GENERAL PHARMACY W/ HCPCS (ALT 636 FOR OP/ED): Performed by: INTERNAL MEDICINE

## 2025-03-21 RX ORDER — REGADENOSON 0.08 MG/ML
0.4 INJECTION, SOLUTION INTRAVENOUS ONCE
Status: COMPLETED | OUTPATIENT
Start: 2025-03-21 | End: 2025-03-21

## 2025-03-21 RX ADMIN — TETROFOSMIN 10.7 MILLICURIE: 0.23 INJECTION, POWDER, LYOPHILIZED, FOR SOLUTION INTRAVENOUS at 08:45

## 2025-03-21 RX ADMIN — REGADENOSON 0.4 MG: 0.08 INJECTION, SOLUTION INTRAVENOUS at 10:39

## 2025-03-21 RX ADMIN — TETROFOSMIN 33.1 MILLICURIE: 0.23 INJECTION, POWDER, LYOPHILIZED, FOR SOLUTION INTRAVENOUS at 10:45

## 2025-03-27 LAB — STRESS PREDICTED METS: 7.7 METS

## 2025-03-27 NOTE — PROGRESS NOTES
Pulmonary Consult    Request of Pulmonary Consult by Luis Enrique Nix DO, to evaluate Mann Mcclain For abnormal imaging .I have independently interviewed and examined the patient in the office and reviewed available records.    Physician HPI (4/16/2025):  A 65 yo M with PMH of HTN, HLP who was referred by his PCP for abnormal imaging.  He had a chest x ray that showed elevated right hemidiaphragm , sniff test was ordered that showed Moderately elevated right hemidiaphragm with abnormal right diaphragmatic motion as above. Minimal excursion and abnormal right diaphragmatic motion during respiration including mild paradoxical motion, concerning for diaphragmatic paralysis.      He denies any SOB , he is active and able to do all activities of daiky walking and can walk up to two blocks without SOB.  He occasional feels chest wheezes when lies on the left side but on positioning chest wheezes improves  He denies any cough or expectoration of chest pain or hemoptysis,  He reports occasional history of postnasal drip in the morning.No allergies or allergic rhinitis  No history of thoracic surgery or trauma of the chest   He has a ct chest 2024  for calcium scoring that showed elevated right hemidiaphragm  He was a smoker for 24 years and quit smoking 2005, during the time of smokinghe had cough and expectoration , he never diagnosed with COPD or asthma  Patient complains of left knee pain he will undergo left knee arthroscopic surgery    Screening for ARY snore at night   No symptoms suggestive ARY    Social history:  Quit smoking 2005 smoked for 25 years 1 pack/day  No past history of cancer    Surgical history  Tonisils , gall bladder removed  Knee replacement  Left shoulder replacement  Right shoulder  repair 8 years vago    Family  Brother passed away from a stomach cancer  Father passed from colon cancer    Immunization History:  Immunization History   Administered Date(s) Administered    Pfizer Purple Cap  SARS-CoV-2 08/13/2021, 09/10/2021    Tdap vaccine, age 7 year and older (BOOSTRIX, ADACEL) 06/28/2024    Zoster, live 01/24/2017       Family History:  Family History   Problem Relation Name Age of Onset    Colon cancer Father         Social History:  Social History     Socioeconomic History    Marital status:    Tobacco Use    Smoking status: Former     Types: Cigars    Smokeless tobacco: Never   Vaping Use    Vaping status: Never Used   Substance and Sexual Activity    Alcohol use: Yes     Alcohol/week: 4.0 standard drinks of alcohol     Types: 1 Glasses of wine, 2 Cans of beer, 1 Standard drinks or equivalent per week    Drug use: Never       Current Medications:  Current Outpatient Medications   Medication Instructions    acetaminophen (Tylenol) 500 mg tablet SB Pain Reliever Ex St 500 MG Oral Tablet   Refills: 0        Start : 24-Jan-2022   Active    aspirin 81 mg, oral, Daily    busPIRone (BUSPAR) 5 mg, oral, 2 times daily    cephalexin (Keflex) 500 mg capsule TAKE FOUR CAPSULES BY MOUTH 1 HOUR BEFORE DENTAL APPOINTMENT    cholecalciferol (Vitamin D-3) 50 MCG (2000 UT) tablet 1 tablet, Daily    fenofibrate (TRICOR) 145 mg, oral, Daily    glucosamine/chondr soto A sod (glucosamine-chondroitin) 1,500-1,200 mg/30 mL liquid Glucosamine-Chondroitin 5474-3497 MG/30ML Oral Liquid   Refills: 0        Start : 24-Jan-2022   Active  480 ML Bottle    ibuprofen 800 mg tablet TAKE 1 TABLET TWICE A DAY AS NEEDED FOR PAIN (NEED APPOINTMENT, CALL OFFICE TODAY TO SCHEDULE)    metoprolol succinate XL (TOPROL-XL) 25 mg, oral, Daily, Do not crush or chew.    multivit-min/ferrous fumarate (MULTI VITAMIN ORAL) Multi Vitamin Oral Tablet   Refills: 0        Start : 24-Jan-2022   Active    rosuvastatin (CRESTOR) 20 mg, oral, Daily    tadalafil (Cialis) 5 mg tablet TAKE 1 TABLET DAILY 1 HOUR BEFORE NEEDED        Drug Allergies/Intolerances:  No Known Allergies     Review of Systems:  No nausea vomiting diarrhea or constipation   No  urinary problems   No neurological problem   all other review of systems are negative and/or non-contributory.    Physical Examination:  BP (!) 180/91   Pulse 76   Resp 18   Ht 1.829 m (6')   Wt 105 kg (231 lb 9.6 oz)   SpO2 95%   BMI 31.41 kg/m²      General: ambulated independently; no acute distress; well-nourished; work of breathing was not increased; normal vocal character  HEENT: normocephalic; anicteric sclerae; conjunctivae not injected; nasal mucosa was unremarkable; oropharynx was clear without evidence of thrush; dentition was good.  Neck: supple; no lymphadenopathy or thyromegaly.  Chest: clear to auscultation bilaterally; no chest wall deformity.  Cardiac: regular rhythm; no gallop or murmur.  Abdomen: soft; non-tender; non-distended; no hepatosplenomegaly.  Extremities: no leg edema; no digital clubbing; 2+ pulses  Psychiatric: did not appear depressed or anxious.    Pulmonary Function Test Results     No results found for this or any previous visit from the past 365 days.      Chest Radiograph     XR chest 2 views 12/12/2024    Narrative  Interpreted By:  Daphnie Issa,  STUDY:  XR CHEST 2 VIEWS;  12/12/2024 9:10 am    INDICATION:  Signs/Symptoms:good.    ,R05.3 Chronic cough    COMPARISON:  None.    ACCESSION NUMBER(S):  [id number]    ORDERING CLINICIAN:  ASHELY MCKEON    FINDINGS:          CARDIOMEDIASTINAL SILHOUETTE:  Cardiomediastinal silhouette is normal in size and configuration.    LUNGS:  Right hemidiaphragm elevation is unchanged. There is mild atelectasis  right lung base. Left lung is clear. No pleural effusion is present.    ABDOMEN:  No remarkable upper abdominal findings.    BONES:  No acute osseous changes. Endoprosthesis of the left shoulder is  partially visualized.    Impression  1.  Unchanged elevation of the right hemidiaphragm with mild  atelectasis right lung base        MACRO:  None    Signed by: Daphnie Issa 12/13/2024 8:25 AM  Dictation workstation:    [id number]    Assessment and Plan / Recommendations:  Problem List Items Addressed This Visit    None  Visit Diagnoses         Paralysis, diaphragm            Right hemidiaphragm paralysis versus eventration   abnormal imaging:  Chest x-ray shows right hemidiaphragm elevation  Sniff test shows paradoxical movement of right hemidiaphragm  CT calcium scoring in 2024: Shows right hemidiaphragm elevation  No pulmonary symptoms  No surgical or traumatic etiology identified  Patient is scheduled to undergo knee surgery in the coming months    Surgical clearance for left arthroscopic surgery  No contraindication for surgery  Recommend optimization of perioperative care  Incentive spirometry to prevent postoperative atelectasis  Aggressive DVT prophylaxis  Optimization of hypertension control    Hypertension:  Blood pressure elevated today, patient did not take his blood pressure medication  Counseled with compliance of hypertension medication    Leah Waters MD  04/16/2025

## 2025-04-08 ENCOUNTER — TELEPHONE (OUTPATIENT)
Dept: CARDIOLOGY | Facility: CLINIC | Age: 65
End: 2025-04-08
Payer: COMMERCIAL

## 2025-04-08 NOTE — TELEPHONE ENCOUNTER
Rec'd notification that patient will be having a left knee arthroscopy, left knee manipulation and lysis of adhesions on 4/29/25.    Requesting cardiac clearance and ok to hold ASA x7 days prior?        Last OV 2/18/25  1.  CAD.  Elevated calcium score 254 units.  He has shortness of breath.  The shortness of breath may be related to myocardial ischemia but could also be just due to deconditioning.  I have asked for him to try to complete an exercise nuclear stress test.  He is not able to, we will convert this to a regadenoson.  He will hold his metoprolol the morning of the test.  I have asked for him to start 1 baby aspirin daily, and rosuvastatin.     Stress Test 3/21/25  1. Normal stress myocardial perfusion imaging in response to   pharmacologic stress.   2. Well-maintained left ventricular function.  62%     1. No electrocardiographic evidence for ischemia at a maximal infusion.   2. Nuclear image results are reported separately.

## 2025-04-10 NOTE — TELEPHONE ENCOUNTER
Clarified with Dr. Tevin ortiz to hold ASA 5 days prior and resume ASAP.    Clearance faxed successfully. Spoke with patient and instructions provided. Understanding verb.

## 2025-04-16 ENCOUNTER — APPOINTMENT (OUTPATIENT)
Facility: CLINIC | Age: 65
End: 2025-04-16
Payer: COMMERCIAL

## 2025-04-16 VITALS
SYSTOLIC BLOOD PRESSURE: 180 MMHG | HEART RATE: 76 BPM | RESPIRATION RATE: 18 BRPM | OXYGEN SATURATION: 95 % | WEIGHT: 231.6 LBS | HEIGHT: 72 IN | DIASTOLIC BLOOD PRESSURE: 91 MMHG | BODY MASS INDEX: 31.37 KG/M2

## 2025-04-16 DIAGNOSIS — J98.6 PARALYSIS, DIAPHRAGM: ICD-10-CM

## 2025-04-16 PROCEDURE — 3077F SYST BP >= 140 MM HG: CPT | Performed by: INTERNAL MEDICINE

## 2025-04-16 PROCEDURE — 99222 1ST HOSP IP/OBS MODERATE 55: CPT | Performed by: INTERNAL MEDICINE

## 2025-04-16 PROCEDURE — 3080F DIAST BP >= 90 MM HG: CPT | Performed by: INTERNAL MEDICINE

## 2025-04-16 PROCEDURE — 3008F BODY MASS INDEX DOCD: CPT | Performed by: INTERNAL MEDICINE

## 2025-04-16 PROCEDURE — G8433 SCR FOR DEP NOT CPT DOC RSN: HCPCS | Performed by: INTERNAL MEDICINE

## 2025-04-16 PROCEDURE — 1036F TOBACCO NON-USER: CPT | Performed by: INTERNAL MEDICINE

## 2025-04-16 ASSESSMENT — PATIENT HEALTH QUESTIONNAIRE - PHQ9
2. FEELING DOWN, DEPRESSED OR HOPELESS: NOT AT ALL
SUM OF ALL RESPONSES TO PHQ9 QUESTIONS 1 AND 2: 0
1. LITTLE INTEREST OR PLEASURE IN DOING THINGS: NOT AT ALL

## 2025-04-16 ASSESSMENT — ENCOUNTER SYMPTOMS
DEPRESSION: 0
LOSS OF SENSATION IN FEET: 0
OCCASIONAL FEELINGS OF UNSTEADINESS: 0

## 2025-04-16 ASSESSMENT — COLUMBIA-SUICIDE SEVERITY RATING SCALE - C-SSRS
1. IN THE PAST MONTH, HAVE YOU WISHED YOU WERE DEAD OR WISHED YOU COULD GO TO SLEEP AND NOT WAKE UP?: NO
6. HAVE YOU EVER DONE ANYTHING, STARTED TO DO ANYTHING, OR PREPARED TO DO ANYTHING TO END YOUR LIFE?: NO
2. HAVE YOU ACTUALLY HAD ANY THOUGHTS OF KILLING YOURSELF?: NO

## 2025-04-17 DIAGNOSIS — I10 PRIMARY HYPERTENSION: Chronic | ICD-10-CM

## 2025-04-17 RX ORDER — METOPROLOL SUCCINATE 25 MG/1
25 TABLET, EXTENDED RELEASE ORAL DAILY
Qty: 90 TABLET | Refills: 2 | Status: SHIPPED | OUTPATIENT
Start: 2025-04-17 | End: 2026-04-17

## 2025-04-17 NOTE — TELEPHONE ENCOUNTER
Rx Refill Request Telephone Encounter    Name:  Mann Mcclain  :  [id number]  Medication Name:  metoprolol succinate XL  Dose : 25 mg  Directions : Take 1 tablet (25 mg) by mouth once daily. Do not crush or chew  [facility]

## 2025-04-21 LAB
NON-UH HIE A/G RATIO: 1.2
NON-UH HIE ALB: 4.1 G/DL (ref 3.4–5)
NON-UH HIE ALK PHOS: 31 UNIT/L (ref 45–117)
NON-UH HIE BASO COUNT: 0.04 X1000 (ref 0–0.2)
NON-UH HIE BASOS %: 0.5 %
NON-UH HIE BILIRUBIN, TOTAL: 0.7 MG/DL (ref 0.3–1.2)
NON-UH HIE BUN/CREAT RATIO: 19
NON-UH HIE BUN: 19 MG/DL (ref 9–23)
NON-UH HIE CALCIUM: 9.8 MG/DL (ref 8.7–10.4)
NON-UH HIE CALCULATED OSMOLALITY: 283 MOSM/KG (ref 275–295)
NON-UH HIE CHLORIDE: 103 MMOL/L (ref 98–107)
NON-UH HIE CO2, VENOUS: 27 MMOL/L (ref 20–31)
NON-UH HIE CREATININE: 1 MG/DL (ref 0.6–1.1)
NON-UH HIE DIFF?: ABNORMAL
NON-UH HIE EOS COUNT: 0.09 X1000 (ref 0–0.5)
NON-UH HIE EOSIN %: 1.4 %
NON-UH HIE GFR AA: >60
NON-UH HIE GLOBULIN: 3.5 G/DL
NON-UH HIE GLOMERULAR FILTRATION RATE: >60 ML/MIN/1.73M?
NON-UH HIE GLUCOSE: 82 MG/DL (ref 74–106)
NON-UH HIE GOT: 38 UNIT/L (ref 15–37)
NON-UH HIE GPT: 44 UNIT/L (ref 10–49)
NON-UH HIE HCT: 44.4 % (ref 41–52)
NON-UH HIE HGB: 15.3 G/DL (ref 13.5–17.5)
NON-UH HIE INSTR WBC: 6.6
NON-UH HIE K: 3.7 MMOL/L (ref 3.5–5.1)
NON-UH HIE LYMPH %: 26.4 %
NON-UH HIE LYMPH COUNT: 1.73 X1000 (ref 1.2–4.8)
NON-UH HIE MCH: 30.8 PG (ref 27–34)
NON-UH HIE MCHC: 34.5 G/DL (ref 32–37)
NON-UH HIE MCV: 89.2 FL (ref 80–100)
NON-UH HIE MONO %: 8.9 %
NON-UH HIE MONO COUNT: 0.59 X1000 (ref 0.1–1)
NON-UH HIE MPV: 7 FL (ref 7.4–10.4)
NON-UH HIE NA: 141 MMOL/L (ref 135–145)
NON-UH HIE NEUTROPHIL %: 62.8 %
NON-UH HIE NEUTROPHIL COUNT (ANC): 4.11 X1000 (ref 1.4–8.8)
NON-UH HIE NUCLEATED RBC: 0 /100WBC
NON-UH HIE PLATELET: 212 X10 (ref 150–450)
NON-UH HIE RBC: 4.98 X10 (ref 4.7–6.1)
NON-UH HIE RDW: 12.5 % (ref 11.5–14.5)
NON-UH HIE TOTAL PROTEIN: 7.6 G/DL (ref 5.7–8.2)
NON-UH HIE WBC: 6.6 X10 (ref 4.5–11)

## 2025-04-24 DIAGNOSIS — F41.9 ANXIETY: ICD-10-CM

## 2025-04-24 NOTE — TELEPHONE ENCOUNTER
Rx Refill Request Telephone Encounter    Name:  Mann Mcclain  :  [id number]  Medication Name:  Buspirone 5 mg tablet   Dose : 5 mg  Route : oral   Frequency : 2 times daily   Quantity : 60 tablet   Directions : TAKE ONE TABLET BY MOUTH TWO TIMES A DAY  Specific Pharmacy location:  [facility]

## 2025-04-25 RX ORDER — BUSPIRONE HYDROCHLORIDE 5 MG/1
5 TABLET ORAL 2 TIMES DAILY
Qty: 60 TABLET | Refills: 0 | Status: SHIPPED | OUTPATIENT
Start: 2025-04-25

## 2025-05-06 LAB
ALBUMIN SERPL-MCNC: 4.6 G/DL (ref 3.6–5.1)
ALBUMIN/GLOB SERPL: 1.8 (CALC) (ref 1–2.5)
ALP SERPL-CCNC: 26 U/L (ref 35–144)
ALT SERPL-CCNC: 36 U/L (ref 9–46)
AST SERPL-CCNC: 30 U/L (ref 10–35)
BILIRUB SERPL-MCNC: 0.9 MG/DL (ref 0.2–1.2)
BUN SERPL-MCNC: 16 MG/DL (ref 7–25)
BUN/CREAT SERPL: ABNORMAL (CALC) (ref 6–22)
CALCIUM SERPL-MCNC: 9.3 MG/DL (ref 8.6–10.3)
CHLORIDE SERPL-SCNC: 103 MMOL/L (ref 98–110)
CHOLEST SERPL-MCNC: 138 MG/DL
CHOLEST/HDLC SERPL: 2.3 (CALC)
CO2 SERPL-SCNC: 26 MMOL/L (ref 20–32)
CREAT SERPL-MCNC: 0.97 MG/DL (ref 0.7–1.35)
EGFRCR SERPLBLD CKD-EPI 2021: 87 ML/MIN/1.73M2
GLOBULIN SER CALC-MCNC: 2.6 G/DL (CALC) (ref 1.9–3.7)
GLUCOSE SERPL-MCNC: 95 MG/DL (ref 65–99)
HDLC SERPL-MCNC: 59 MG/DL
LDLC SERPL CALC-MCNC: 62 MG/DL (CALC)
NONHDLC SERPL-MCNC: 79 MG/DL (CALC)
POTASSIUM SERPL-SCNC: 4.3 MMOL/L (ref 3.5–5.3)
PROT SERPL-MCNC: 7.2 G/DL (ref 6.1–8.1)
SODIUM SERPL-SCNC: 140 MMOL/L (ref 135–146)
TRIGL SERPL-MCNC: 91 MG/DL

## 2025-05-08 ENCOUNTER — OFFICE VISIT (OUTPATIENT)
Dept: CARDIOLOGY | Facility: CLINIC | Age: 65
End: 2025-05-08
Payer: MEDICARE

## 2025-05-08 VITALS
WEIGHT: 226 LBS | OXYGEN SATURATION: 97 % | DIASTOLIC BLOOD PRESSURE: 82 MMHG | BODY MASS INDEX: 30.65 KG/M2 | SYSTOLIC BLOOD PRESSURE: 136 MMHG | HEART RATE: 73 BPM

## 2025-05-08 DIAGNOSIS — R06.02 SHORTNESS OF BREATH: Chronic | ICD-10-CM

## 2025-05-08 DIAGNOSIS — I10 PRIMARY HYPERTENSION: Chronic | ICD-10-CM

## 2025-05-08 DIAGNOSIS — I25.10 CORONARY ARTERY DISEASE INVOLVING NATIVE CORONARY ARTERY OF NATIVE HEART WITHOUT ANGINA PECTORIS: Primary | Chronic | ICD-10-CM

## 2025-05-08 DIAGNOSIS — E78.2 HYPERLIPIDEMIA, MIXED: Chronic | ICD-10-CM

## 2025-05-08 PROCEDURE — 1160F RVW MEDS BY RX/DR IN RCRD: CPT | Performed by: INTERNAL MEDICINE

## 2025-05-08 PROCEDURE — 1123F ACP DISCUSS/DSCN MKR DOCD: CPT | Performed by: INTERNAL MEDICINE

## 2025-05-08 PROCEDURE — 3079F DIAST BP 80-89 MM HG: CPT | Performed by: INTERNAL MEDICINE

## 2025-05-08 PROCEDURE — 99213 OFFICE O/P EST LOW 20 MIN: CPT | Performed by: INTERNAL MEDICINE

## 2025-05-08 PROCEDURE — 1036F TOBACCO NON-USER: CPT | Performed by: INTERNAL MEDICINE

## 2025-05-08 PROCEDURE — 99212 OFFICE O/P EST SF 10 MIN: CPT | Performed by: INTERNAL MEDICINE

## 2025-05-08 PROCEDURE — 1159F MED LIST DOCD IN RCRD: CPT | Performed by: INTERNAL MEDICINE

## 2025-05-08 PROCEDURE — 3075F SYST BP GE 130 - 139MM HG: CPT | Performed by: INTERNAL MEDICINE

## 2025-05-08 NOTE — PATIENT INSTRUCTIONS
1.  CAD.  Elevated calcium score 254 units.  He has shortness of breath.  The shortness of breath may be related to myocardial ischemia but could also be just due to deconditioning.   I have asked for him to start 1 baby aspirin daily, and rosuvastatin. AST 3/21/2025 normal.  Ejection fraction 62%    2.  Hyperlipidemia.  10/10/2024  HDL 56 triglycerides 105 LFTs normal.  He is already on fenofibric.  I will add rosuvastatin 20 mg.  5/6/2025 LDL 62 HDL 59 triglycerides 91 LFTs normal    3.  Hypertension. Well controlled with metoprolol succ 25mg/day    RTC 1 year

## 2025-05-08 NOTE — PROGRESS NOTES
Referred by Chrissy SEO obtained.  The request of Dr. Nix for evaluation of shortness of breath and coronary artery disease.  Mann is 64.  He has hypertension and hyperlipidemia.  He is active but somewhat limited because of arthritic issues especially in his left knee.  He has no chest pain but over the last 1 to 2 years he has been noticing more dyspnea with exertion.  No orthopnea no PND no significant palpitations.  He has been managed with metoprolol succinate for blood pressure and with fenofibrate for hyperlipidemia.  A calcium score came back elevated in October 2024 measuring 254 units.  He was therefore sent for further evaluation.    Past Medical History:  Problem List Items Addressed This Visit    None     Past Medical History:   Diagnosis Date    Coronary artery disease involving native coronary artery of native heart without angina pectoris 02/17/2025    Elevated  units      HTN (hypertension) 01/28/2023    Hyperlipidemia, mixed 01/28/2023    Shortness of breath 02/18/2025        Past Surgical History:  He has a past surgical history that includes Other surgical history (01/24/2022); Shoulder surgery; and Total knee arthroplasty (Left).      Social History:  He reports that he has quit smoking. His smoking use included cigars. He has never used smokeless tobacco. He reports current alcohol use of about 4.0 standard drinks of alcohol per week. He reports that he does not use drugs.    Family History:  Family History   Problem Relation Name Age of Onset    Colon cancer Father       Allergies:  Patient has no known allergies.    Outpatient Medications:  Current Outpatient Medications   Medication Instructions    acetaminophen (Tylenol) 500 mg tablet SB Pain Reliever Ex St 500 MG Oral Tablet   Refills: 0        Start : 24-Jan-2022   Active    aspirin 81 mg, oral, Daily    busPIRone (BUSPAR) 5 mg, oral, 2 times daily    cephalexin (Keflex) 500 mg capsule TAKE FOUR CAPSULES BY MOUTH 1 HOUR BEFORE  DENTAL APPOINTMENT    cholecalciferol (Vitamin D-3) 50 MCG (2000 UT) tablet 1 tablet, Daily    fenofibrate (TRICOR) 145 mg, oral, Daily    glucosamine/chondr soto A sod (glucosamine-chondroitin) 1,500-1,200 mg/30 mL liquid Glucosamine-Chondroitin 5830-2698 MG/30ML Oral Liquid   Refills: 0        Start : 24-Jan-2022   Active  480 ML Bottle    ibuprofen 800 mg tablet TAKE 1 TABLET TWICE A DAY AS NEEDED FOR PAIN (NEED APPOINTMENT, CALL OFFICE TODAY TO SCHEDULE)    metoprolol succinate XL (TOPROL-XL) 25 mg, oral, Daily, Do not crush or chew.    multivit-min/ferrous fumarate (MULTI VITAMIN ORAL) Multi Vitamin Oral Tablet   Refills: 0        Start : 24-Jan-2022   Active    rosuvastatin (CRESTOR) 20 mg, oral, Daily    tadalafil (Cialis) 5 mg tablet TAKE 1 TABLET DAILY 1 HOUR BEFORE NEEDED     Last Recorded Vitals:  There were no vitals filed for this visit.    Physical Exam  Patient is alert and oriented x3.  HEENT is unremarkable mucous members are moist  Neck no JVP no bruits upstrokes are full no thyromegaly  Lungs are clear bilaterally.  No wheezing crackles or rales  Heart regular rhythm normal S1-S2 there is no S3 no murmurs are heard.  Abdomen is soft bs are positive nontender nondistended no organomegaly no pulsatile masses  Extremities have no edema.  Distal pulses present palpable.  Neuro is grossly nonfocal  Skin has no rashes     Last Labs:  CBC -  Lab Results   Component Value Date    WBC 4.6 10/10/2024    HGB 15.4 10/10/2024    HCT 46.9 10/10/2024    MCV 91 10/10/2024     10/10/2024     CMP -  Lab Results   Component Value Date    CALCIUM 9.3 05/06/2025    PROT 7.2 05/06/2025    ALBUMIN 4.6 05/06/2025    AST 30 05/06/2025    ALT 36 05/06/2025    ALKPHOS 26 (L) 05/06/2025    BILITOT 0.9 05/06/2025     LIPID PANEL -   Lab Results   Component Value Date    CHOL 138 05/06/2025    HDL 59 05/06/2025    CHHDL 2.3 05/06/2025    VLDL 21 10/10/2024    TRIG 91 05/06/2025    NHDL 79 05/06/2025     RENAL FUNCTION  PANEL -   Lab Results   Component Value Date    K 4.3 05/06/2025     Lab Results   Component Value Date    HGBA1C 5.2 03/09/2022   Procedures    AST 3/21/2025 normal.  Ejection fraction 62%    CAC 10/3/2024 elevated 254 units         Assessment/Plan   1.  CAD.  Elevated calcium score 254 units.  He has shortness of breath.  The shortness of breath may be related to myocardial ischemia but could also be just due to deconditioning.   I have asked for him to start 1 baby aspirin daily, and rosuvastatin. AST 3/21/2025 normal.  Ejection fraction 62%    2.  Hyperlipidemia.  10/10/2024  HDL 56 triglycerides 105 LFTs normal.  He is already on fenofibric.  I will add rosuvastatin 20 mg.  5/6/2025 LDL 62 HDL 59 triglycerides 91 LFTs normal    3.  Hypertension. Well controlled with metoprolol succ 25mg/day    RTC 1 year    Faustino Lowe MD     Instructions and follow up

## 2025-05-18 DIAGNOSIS — I25.10 CORONARY ARTERY DISEASE INVOLVING NATIVE CORONARY ARTERY OF NATIVE HEART WITHOUT ANGINA PECTORIS: Chronic | ICD-10-CM

## 2025-07-03 DIAGNOSIS — F41.9 ANXIETY: ICD-10-CM

## 2025-07-03 RX ORDER — BUSPIRONE HYDROCHLORIDE 5 MG/1
5 TABLET ORAL 2 TIMES DAILY
Qty: 60 TABLET | Refills: 0 | Status: SHIPPED | OUTPATIENT
Start: 2025-07-03

## 2025-08-29 DIAGNOSIS — E78.2 HYPERLIPIDEMIA, MIXED: ICD-10-CM

## 2025-08-29 RX ORDER — FENOFIBRATE 145 MG/1
145 TABLET, FILM COATED ORAL DAILY
Qty: 90 TABLET | Refills: 3 | Status: SHIPPED | OUTPATIENT
Start: 2025-08-29

## 2025-09-04 DIAGNOSIS — F41.9 ANXIETY: ICD-10-CM

## 2025-09-04 RX ORDER — BUSPIRONE HYDROCHLORIDE 5 MG/1
5 TABLET ORAL 2 TIMES DAILY
Qty: 60 TABLET | Refills: 0 | Status: SHIPPED | OUTPATIENT
Start: 2025-09-04